# Patient Record
Sex: MALE | Race: WHITE | Employment: FULL TIME | URBAN - METROPOLITAN AREA
[De-identification: names, ages, dates, MRNs, and addresses within clinical notes are randomized per-mention and may not be internally consistent; named-entity substitution may affect disease eponyms.]

---

## 2017-09-20 RX ORDER — ATORVASTATIN CALCIUM 20 MG/1
TABLET, FILM COATED ORAL
Qty: 30 TABLET | Refills: 0 | Status: SHIPPED | OUTPATIENT
Start: 2017-09-20 | End: 2017-12-19 | Stop reason: SDUPTHER

## 2017-12-13 ENCOUNTER — TELEPHONE (OUTPATIENT)
Dept: CARDIOLOGY CLINIC | Age: 57
End: 2017-12-13

## 2017-12-13 NOTE — TELEPHONE ENCOUNTER
Attempted to call pt, number is no longer good. Called [facility] who is requesting atorvastatin to put a note in their chart for pt to contact us for appt .

## 2017-12-19 NOTE — TELEPHONE ENCOUNTER
Last ov-9/8/16 with RKG  Next ov- not scheduled  Last labs-3/30/14 TSH, Lipid, Cmp  Last EKG-6/10/13    We have been trying to contact the Mr Tatiana Nur to schedule. Phone number not working. Pharmacy contacted on 12/13/17 to have Pt contact us for appt.

## 2017-12-20 RX ORDER — ATORVASTATIN CALCIUM 20 MG/1
TABLET, FILM COATED ORAL
Qty: 30 TABLET | Refills: 0 | Status: SHIPPED | OUTPATIENT
Start: 2017-12-20 | End: 2018-02-07 | Stop reason: SDUPTHER

## 2018-01-04 ENCOUNTER — TELEPHONE (OUTPATIENT)
Dept: CARDIOLOGY CLINIC | Age: 58
End: 2018-01-04

## 2018-02-16 ENCOUNTER — TELEPHONE (OUTPATIENT)
Dept: CARDIOLOGY CLINIC | Age: 58
End: 2018-02-16

## 2018-02-26 ENCOUNTER — OFFICE VISIT (OUTPATIENT)
Dept: CARDIOLOGY CLINIC | Age: 58
End: 2018-02-26

## 2018-02-26 VITALS
SYSTOLIC BLOOD PRESSURE: 128 MMHG | HEART RATE: 70 BPM | DIASTOLIC BLOOD PRESSURE: 74 MMHG | WEIGHT: 178 LBS | HEIGHT: 74 IN | OXYGEN SATURATION: 96 % | BODY MASS INDEX: 22.84 KG/M2

## 2018-02-26 DIAGNOSIS — I25.10 CORONARY ARTERY DISEASE INVOLVING NATIVE CORONARY ARTERY OF NATIVE HEART WITHOUT ANGINA PECTORIS: Primary | ICD-10-CM

## 2018-02-26 DIAGNOSIS — E78.2 MIXED HYPERLIPIDEMIA: ICD-10-CM

## 2018-02-26 PROCEDURE — 99214 OFFICE O/P EST MOD 30 MIN: CPT | Performed by: INTERNAL MEDICINE

## 2018-02-26 RX ORDER — ATORVASTATIN CALCIUM 20 MG/1
20 TABLET, FILM COATED ORAL DAILY
Qty: 90 TABLET | Refills: 3 | Status: SHIPPED | OUTPATIENT
Start: 2018-02-26 | End: 2019-02-27 | Stop reason: SDUPTHER

## 2018-02-26 NOTE — PROGRESS NOTES
[address] Office Note  2/26/2018     Subjective:  Mr. Brittany Browne is here today for follow up of angioplasty for CAD and MI on 06/13/15. Today he reports he feels well over all. He continues to smoke but is ready to quit. He remains active. He denies chest pain, palpitations, syncope, dizziness or shortness of breath. HPI: Patient  underwent cardiac cath on 06/13/15 for an anterior MI. STACEY was placed in the OM1. He continues to smoke 1 ppd. He has not followed up in office for some time. Review of Systems:  12 point ROS negative in all areas as listed below except as in [place]  Constitutional, EENT, Cardiovascular, pulmonary, GI, , Musculoskeletal, skin, neurological, hematological, endocrine, Psychiatric  Reviewed past medical history, social, and family history. Smoking 1PPD but trying to quit  Mother: Colon cancer, no heart disease  Father:  No heart disease   Past Medical History:   Diagnosis Date    CAD (coronary artery disease)      Past Surgical History:   Procedure Laterality Date    CORONARY ANGIOPLASTY WITH STENT PLACEMENT  06/2013       Objective:   /74   Pulse 70   Ht 6' 2\" (1.88 m)   Wt 178 lb (80.7 kg)   SpO2 96%   BMI 22.85 kg/m²     Wt Readings from Last 3 Encounters:   02/26/18 178 lb (80.7 kg)   09/08/16 174 lb (78.9 kg)   09/11/15 176 lb (79.8 kg)       Physical Exam:  General: No Respiratory distress, appears well developed and well nourished. Eyes:  Sclera nonicteric  Nose/Sinuses:  negative findings: nose shows no deformity, asymmetry, or inflammation, nasal mucosa normal, septum midline with no perforation or bleeding  Back:  no pain to palpation  Joint:  no active joint inflammation  Musculoskeletal:  negative  Skin:  Warm and dry  Neck:  Negative for JVD and Carotid Bruits or subclavian bruits. Chest:  Diminished breath sounds to auscultation bilaterally, respiration easy  Cardiovascular:  RRR, S1S2 normal, no murmur, no rub or thrill.   Abdomen:  Soft

## 2018-02-26 NOTE — COMMUNICATION BODY
normal liver and spleen, no abnormal pulsations. Extremities:   No edema, clubbing, cyanosis,  Pulses: Femoral and pedal pulses are normal.  Neuro: intact    Medications:   Outpatient Encounter Prescriptions as of 2/26/2018   Medication Sig Dispense Refill    metoprolol tartrate (LOPRESSOR) 25 MG tablet TAKE 1/2 TABLET BY MOUTH TWO TIMES A DAY **GENERIC FOR LOPRESSOR** 30 tablet 2    aspirin 81 MG tablet Take 1 tablet by mouth daily. 30 tablet 11    atorvastatin (LIPITOR) 20 MG tablet TAKE 1 TABLET BY MOUTH NIGHTLY 30 tablet 0     No facility-administered encounter medications on file as of 2/26/2018. Lab Data:  LIPID: 6/11/13  Cholesterol, Total 152  Triglycerides 100  HDL 34 (L)  LDL Calculated 97      Lab Results   Component Value Date    TRIG 101 03/30/2014    TRIG 100 06/11/2013     Lab Results   Component Value Date    HDL 38 (L) 03/30/2014    HDL 34 (L) 06/11/2013     Lab Results   Component Value Date    LDLCALC 67 03/30/2014    LDLCALC 97 06/11/2013     Lab Results   Component Value Date    LABVLDL 20 03/30/2014    LABVLDL 20 06/11/2013     IMAGING:   Echo: 4/1/14  Summary  Normal left ventricle size and wall thickness. The left ventricular systolic  function is mildly reduced with an ejection fraction of 50%. There is  hypokinesis of the inferior lateral wall. Mild mitral regurgitation. Echo: 06/12/2013  Normal left ventricle size, wall thickness and systolic function with an  estimated ejection fraction of 55%. No regional wall motion abnormalities  are seen. Mitral valve is structurally normal.  Mild mitral regurgitation is present. Cardiac Cath: 06/13/2013  LM-normal   LAD-normal   CX-20% mid   OM1-100% to 0% with thrombectomy, PTCA, and Resolute STACEY 2.25 x 26   RCA- mild luminal irregularities   LVEF- 45% with lateral hypokinesis    Assessment:  Ayan Silva was seen today for 1 year follow up, coronary artery disease and hyperlipidemia.     Diagnoses and associated orders for this

## 2019-02-27 RX ORDER — ATORVASTATIN CALCIUM 20 MG/1
TABLET, FILM COATED ORAL
Qty: 90 TABLET | Refills: 0 | Status: SHIPPED | OUTPATIENT
Start: 2019-02-27 | End: 2019-06-10 | Stop reason: SDUPTHER

## 2019-06-10 RX ORDER — ATORVASTATIN CALCIUM 20 MG/1
TABLET, FILM COATED ORAL
Qty: 90 TABLET | Refills: 5 | Status: SHIPPED | OUTPATIENT
Start: 2019-06-10 | End: 2020-07-13

## 2019-07-08 ENCOUNTER — OFFICE VISIT (OUTPATIENT)
Dept: CARDIOLOGY CLINIC | Age: 59
End: 2019-07-08
Payer: COMMERCIAL

## 2019-07-08 VITALS
HEIGHT: 74 IN | WEIGHT: 179 LBS | OXYGEN SATURATION: 98 % | HEART RATE: 64 BPM | BODY MASS INDEX: 22.97 KG/M2 | DIASTOLIC BLOOD PRESSURE: 64 MMHG | SYSTOLIC BLOOD PRESSURE: 110 MMHG

## 2019-07-08 DIAGNOSIS — Z72.0 TOBACCO ABUSE: ICD-10-CM

## 2019-07-08 DIAGNOSIS — I25.10 CORONARY ARTERY DISEASE INVOLVING NATIVE CORONARY ARTERY OF NATIVE HEART WITHOUT ANGINA PECTORIS: Primary | ICD-10-CM

## 2019-07-08 DIAGNOSIS — E78.2 MIXED HYPERLIPIDEMIA: ICD-10-CM

## 2019-07-08 PROCEDURE — 99214 OFFICE O/P EST MOD 30 MIN: CPT | Performed by: INTERNAL MEDICINE

## 2019-07-08 NOTE — PATIENT INSTRUCTIONS
artery disease involving native coronary artery of native heart without angina pectoris    Hyperlipemia  No recent lipids   Needs to be rechecked     Smoker   Smoking cessation discussed Help and encouragement provided. Plan:  1. Importance of smoking cessation discussed. Resources available: 8-951-gwubtcz  2. Continue all current medications he is on asa statin and beta blockers  3. Healthy lifestyle education reviewed including nutrition, exercise and activity  4. Will check fasting lipids and CMP   5. Follow up in 1 year        QUALITY MEASURES  1. Tobacco Cessation Counseling: YES   2. Retake of BP if >140/90:   NA  3. Documentation to PCP/referring for new patient:  Sent to PCP at close of office visit  4. CAD patient on anti-platelet: ASA  5. CAD patient on STATIN therapy:  Atorvastatin   6. Patient with CHF and aFib on anticoagulation:  NA     I, Dr. Lyric Lazaro, personally performed the services described in this documentation, as scribed by the above signed scribe in my presence. It is both accurate and complete to my knowledge. I agree with the details independently gathered by the clinical support staff, while the remaining scribed note accurately describes my personal service to the patient.       [address] [place], MD 7/8/2019 3:44 PM

## 2019-07-08 NOTE — PROGRESS NOTES
liver and spleen, no abnormal pulsations. Extremities:   No edema, clubbing, cyanosis,  Pulses: pedal pulses are normal.  Neuro: intact    Medications:   Outpatient Encounter Medications as of 7/8/2019   Medication Sig Dispense Refill    metoprolol tartrate (LOPRESSOR) 25 MG tablet TAKE ONE-HALF TABLET BY MOUTH TWO TIMES DAILY. GENERIC FOR LOPRESSOR 90 tablet 5    atorvastatin (LIPITOR) 20 MG tablet TAKE 1 TABLET BY MOUTH ONE TIME A DAY . 90 tablet 5    aspirin 81 MG tablet Take 1 tablet by mouth daily. 30 tablet 11     No facility-administered encounter medications on file as of 7/8/2019. Lab Data:  LIPID: 6/11/13  Cholesterol, Total 152  Triglycerides 100  HDL 34 (L)  LDL Calculated 97      Lab Results   Component Value Date    TRIG 101 03/30/2014    TRIG 100 06/11/2013     Lab Results   Component Value Date    HDL 38 (L) 03/30/2014    HDL 34 (L) 06/11/2013     Lab Results   Component Value Date    LDLCALC 67 03/30/2014    LDLCALC 97 06/11/2013     Lab Results   Component Value Date    LABVLDL 20 03/30/2014    LABVLDL 20 06/11/2013     IMAGING:   Echo: 4/1/14  Summary  Normal left ventricle size and wall thickness. The left ventricular systolic  function is mildly reduced with an ejection fraction of 50%. There is  hypokinesis of the inferior lateral wall. Mild mitral regurgitation. Echo: 06/12/2013  Normal left ventricle size, wall thickness and systolic function with an  estimated ejection fraction of 55%. No regional wall motion abnormalities  are seen. Mitral valve is structurally normal.  Mild mitral regurgitation is present. Cardiac Cath: 06/13/2013  LM-normal   LAD-normal   CX-20% mid   OM1-100% to 0% with thrombectomy, PTCA, and Resolute STACEY 2.25 x 26   RCA- mild luminal irregularities   LVEF- 45% with lateral hypokinesis    Assessment:  Roderick Tolbert was seen today for 1 year follow up, coronary artery disease and hyperlipidemia.     Diagnoses and associated orders for this visit:    Coronary artery disease involving native coronary artery of native heart without angina pectoris    Hyperlipemia  No recent lipids   Needs to be rechecked     Smoker   Smoking cessation discussed Help and encouragement provided. Plan:  1. Importance of smoking cessation discussed. Resources available: 3-508-ykjovys  2. Continue all current medications he is on asa statin and beta blockers  3. Healthy lifestyle education reviewed including nutrition, exercise and activity  4. Will check fasting lipids and CMP   5. Follow up in 1 year        QUALITY MEASURES  1. Tobacco Cessation Counseling: YES   2. Retake of BP if >140/90:   NA  3. Documentation to PCP/referring for new patient:  Sent to PCP at close of office visit  4. CAD patient on anti-platelet: ASA  5. CAD patient on STATIN therapy:  Atorvastatin   6. Patient with CHF and aFib on anticoagulation:  NA     I, Dr. Scott Villasenor, personally performed the services described in this documentation, as scribed by the above signed scribe in my presence. It is both accurate and complete to my knowledge. I agree with the details independently gathered by the clinical support staff, while the remaining scribed note accurately describes my personal service to the patient.       [address] [place], MD 7/8/2019 3:44 PM

## 2020-07-13 RX ORDER — ATORVASTATIN CALCIUM 20 MG/1
TABLET, FILM COATED ORAL
Qty: 90 TABLET | Refills: 0 | Status: SHIPPED | OUTPATIENT
Start: 2020-07-13 | End: 2020-08-25 | Stop reason: SDUPTHER

## 2020-08-20 NOTE — PROGRESS NOTES
[facility] Office Note  8/25/2020     Subjective:  Mr. Hussein Bhatti is here today for cardiology  follow up of  CAD, HLD     [place]:   Today he reports feeling well no new complaints. Denies chest pain, shortness of breath, edema, dizziness, palpitations and syncope. He continues to smoke daily. 1 ppd no desire to quit    PMH:  Patient  underwent cardiac cath on 06/13/15 for an anterior MI. STACEY was placed in the OM1. He continues to smoke 1 ppd. He has not followed up in office for some time. Review of Systems:  12 point ROS negative in all areas as listed below except as in [place]  Constitutional, EENT, Cardiovascular, pulmonary, GI, , Musculoskeletal, skin, neurological, hematological, endocrine, Psychiatric  Reviewed past medical history, social, and family history. Smoking 1PPD but trying to quit  Mother: Colon cancer, no heart disease  Father:  No heart disease   Past Medical History:   Diagnosis Date    CAD (coronary artery disease)      Past Surgical History:   Procedure Laterality Date    CORONARY ANGIOPLASTY WITH STENT PLACEMENT  06/2013       Objective:   /60   Pulse 67   Temp 96.8 °F (36 °C)   Ht 6' 2\" (1.88 m)   Wt 183 lb (83 kg)   SpO2 98%   BMI 23.50 kg/m²     Wt Readings from Last 3 Encounters:   08/25/20 183 lb (83 kg)   07/08/19 179 lb (81.2 kg)   02/26/18 178 lb (80.7 kg)       Physical Exam:  General: No Respiratory distress, appears well developed and well nourished. Eyes:  Sclera nonicteric  Nose/Sinuses:  negative findings: nose shows no deformity, asymmetry, or inflammation, nasal mucosa normal, septum midline with no perforation or bleeding  Back:  no pain to palpation  Joint:  no active joint inflammation  Musculoskeletal:  negative  Skin:  Warm and dry  Neck:  Negative for JVD and Carotid Bruits or subclavian bruits. Chest:  Clear to auscultation bilaterally, respiration easy  Cardiovascular:  RRR, S1S2 normal, no murmur, no rub or thrill.   Abdomen: thrombectomy, PTCA, and Resolute STACEY 2.25 x 26   RCA- mild luminal irregularities   LVEF- 45% with lateral hypokinesis    Assessment:  Encounter Diagnoses   Name Primary?  Coronary artery disease involving native coronary artery of native heart without angina pectoris Yes    Mixed hyperlipidemia        Ignacia Mcadams was seen today for 1 year follow up, coronary artery disease and hyperlipidemia. Diagnoses and associated orders for this visit:    Coronary artery disease involving native coronary artery of native heart without angina pectoris    Hyperlipemia  No recent lipids   Needs to be rechecked     Smoker   Smoking cessation discussed Help and encouragement provided. Plan:  1. Importance of smoking cessation discussed. Resources available: 1-800-quit-now  2. Continue all current medications he is on asa statin and beta blockers  3. Healthy lifestyle education reviewed including nutrition, exercise and activity  4. Will check fasting lipids cbc, and CMP   5. Follow up in 1 year        QUALITY MEASURES  1. Tobacco Cessation Counseling: YES   2. Retake of BP if >140/90:   NA  3. Documentation to PCP/referring for new patient:  Sent to PCP at close of office visit  4. CAD patient on anti-platelet: ASA  5. CAD patient on STATIN therapy:  Atorvastatin   6. Patient with CHF and aFib on anticoagulation:  NA     This note was scribed in the presence of  Shannan Barbosa MD by Anila Borrego RN  I, Dr. Shannan Barbosa, personally performed the services described in this documentation, as scribed by the above signed scribe in my presence. It is both accurate and complete to my knowledge. I agree with the details independently gathered by the clinical support staff, while the remaining scribed note accurately describes my personal service to the patient.         Levon Ortiz MD 8/25/2020 3:37 PM

## 2020-08-25 ENCOUNTER — OFFICE VISIT (OUTPATIENT)
Dept: CARDIOLOGY CLINIC | Age: 60
End: 2020-08-25
Payer: COMMERCIAL

## 2020-08-25 VITALS
BODY MASS INDEX: 23.49 KG/M2 | SYSTOLIC BLOOD PRESSURE: 100 MMHG | HEIGHT: 74 IN | TEMPERATURE: 96.8 F | OXYGEN SATURATION: 98 % | HEART RATE: 67 BPM | WEIGHT: 183 LBS | DIASTOLIC BLOOD PRESSURE: 60 MMHG

## 2020-08-25 PROCEDURE — 99214 OFFICE O/P EST MOD 30 MIN: CPT | Performed by: INTERNAL MEDICINE

## 2020-08-25 RX ORDER — ATORVASTATIN CALCIUM 20 MG/1
20 TABLET, FILM COATED ORAL DAILY
Qty: 90 TABLET | Refills: 3 | Status: SHIPPED | OUTPATIENT
Start: 2020-08-25 | End: 2021-09-10

## 2020-08-25 NOTE — PATIENT INSTRUCTIONS
Plan:  1. Importance of smoking cessation discussed. Resources available: 1-800-quit-now  2. Continue all current medications he is on asa statin and beta blockers  3. Healthy lifestyle education reviewed including nutrition, exercise and activity  4. Will check fasting lipids cbc, and CMP   5.  Follow up in 1 year

## 2021-09-10 RX ORDER — ATORVASTATIN CALCIUM 20 MG/1
20 TABLET, FILM COATED ORAL DAILY
Qty: 30 TABLET | Refills: 0 | Status: SHIPPED | OUTPATIENT
Start: 2021-09-10 | End: 2021-10-19

## 2021-09-10 NOTE — TELEPHONE ENCOUNTER
LV 8/25/2020 Dr. Elida Thakur ([facility])  NEEDS OV-this message was placed on refills. Gave only 30 day supply.   Attempted to call pt-phone busy

## 2021-10-18 ENCOUNTER — TELEPHONE (OUTPATIENT)
Dept: CARDIOLOGY CLINIC | Age: 61
End: 2021-10-18

## 2021-10-18 NOTE — TELEPHONE ENCOUNTER
Pt needs refills on the following metoprolol tartrate (LOPRESSOR) 25 MG tablet and atorvastatin (LIPITOR) 20 MG tablet. Pt needs 1 yr appt ,but no openings at [place] or [place](pt prefers due to working downtown [email or web address]) needs the latest available.     [address], [address] [facility]

## 2021-10-19 RX ORDER — ATORVASTATIN CALCIUM 20 MG/1
TABLET, FILM COATED ORAL
Qty: 30 TABLET | Refills: 0 | Status: SHIPPED | OUTPATIENT
Start: 2021-10-19 | End: 2021-10-25 | Stop reason: SDUPTHER

## 2021-10-19 NOTE — TELEPHONE ENCOUNTER
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

## 2021-10-21 NOTE — TELEPHONE ENCOUNTER
8/25/2020 RK, meds pended.        Per Jagjit Bolanos pt can be scheduled at [facility] 11/3 at 12:45 pm.

## 2021-10-25 RX ORDER — ATORVASTATIN CALCIUM 20 MG/1
20 TABLET, FILM COATED ORAL DAILY
Qty: 30 TABLET | Refills: 5 | Status: SHIPPED | OUTPATIENT
Start: 2021-10-25 | End: 2022-01-25 | Stop reason: SDUPTHER

## 2021-10-25 RX ORDER — ATORVASTATIN CALCIUM 20 MG/1
TABLET, FILM COATED ORAL
Qty: 90 TABLET | Refills: 0 | OUTPATIENT
Start: 2021-10-25

## 2022-01-25 ENCOUNTER — OFFICE VISIT (OUTPATIENT)
Dept: CARDIOLOGY CLINIC | Age: 62
End: 2022-01-25
Payer: COMMERCIAL

## 2022-01-25 VITALS
HEART RATE: 61 BPM | SYSTOLIC BLOOD PRESSURE: 118 MMHG | BODY MASS INDEX: 24.13 KG/M2 | WEIGHT: 188 LBS | OXYGEN SATURATION: 98 % | HEIGHT: 74 IN | DIASTOLIC BLOOD PRESSURE: 76 MMHG

## 2022-01-25 DIAGNOSIS — I25.10 CORONARY ARTERY DISEASE INVOLVING NATIVE CORONARY ARTERY OF NATIVE HEART WITHOUT ANGINA PECTORIS: Primary | ICD-10-CM

## 2022-01-25 DIAGNOSIS — E78.2 MIXED HYPERLIPIDEMIA: ICD-10-CM

## 2022-01-25 PROCEDURE — 99214 OFFICE O/P EST MOD 30 MIN: CPT | Performed by: INTERNAL MEDICINE

## 2022-01-25 RX ORDER — ATORVASTATIN CALCIUM 20 MG/1
20 TABLET, FILM COATED ORAL DAILY
Qty: 90 TABLET | Refills: 3 | Status: SHIPPED | OUTPATIENT
Start: 2022-01-25

## 2022-01-25 NOTE — PATIENT INSTRUCTIONS
Plan:  1. Lab work from PCP once completed from visit today. 2.  Refills warranted for metoprolol tartrate and atorvastatin  3. Follow up with me in 1 year.

## 2022-01-25 NOTE — PROGRESS NOTES
[facility] Office Note  1/25/2022     Subjective:  Mr. Dustin Velarde is here today for 1 year cardiology  follow up of  CAD, HLD     [place]:   8/25/20 he reported feeling well no new complaints. Denied chest pain, shortness of breath, edema, dizziness, palpitations and syncope. He continues to smoke daily. 1 ppd no desire to quit     Today he reports that he has been feeling okay since last being seen. Compliant with taking all medications and tolerates them well. Has an appointment today with his PCP and will get lab work completed through that office. Denies chest pain, sob, dizziness, syncope and edema. PMH:  Patient  underwent cardiac cath on 06/13/15 for an anterior MI. STACEY was placed in the OM1. He continues to smoke 1 ppd. He has not followed up in office for some time. Review of Systems:  12 point ROS negative in all areas as listed below except as in [place]  Constitutional, EENT, Cardiovascular, pulmonary, GI, , Musculoskeletal, skin, neurological, hematological, endocrine, Psychiatric  Reviewed past medical history, social, and family history. Smoking 1PPD but trying to quit  Mother: Colon cancer, no heart disease  Father:  No heart disease   Past Medical History:   Diagnosis Date    CAD (coronary artery disease)      Past Surgical History:   Procedure Laterality Date    CORONARY ANGIOPLASTY WITH STENT PLACEMENT  06/2013       Objective:   /76   Pulse 61   Ht 6' 2\" (1.88 m)   Wt 188 lb (85.3 kg)   SpO2 98%   BMI 24.14 kg/m²     Wt Readings from Last 3 Encounters:   01/25/22 188 lb (85.3 kg)   08/25/20 183 lb (83 kg)   07/08/19 179 lb (81.2 kg)       Physical Exam:  General: No Respiratory distress, appears well developed and well nourished.    Eyes:  Sclera nonicteric  Nose/Sinuses:  negative findings: nose shows no deformity, asymmetry, or inflammation, nasal mucosa normal, septum midline with no perforation or bleeding  Back:  no pain to palpation  Joint:  no active joint inflammation  Musculoskeletal:  negative  Skin:  Warm and dry  Neck:  Negative for JVD and Carotid Bruits or subclavian bruits. Chest:  Clear to auscultation bilaterally, respiration easy  Cardiovascular:  RRR, S1S2 normal, no murmur, no rub or thrill. Abdomen:  Soft normal liver and spleen, no abnormal pulsations. Extremities:   No edema, clubbing, cyanosis,  Pulses: pedal pulses are normal.  Neuro: intact    Medications:   Outpatient Encounter Medications as of 1/25/2022   Medication Sig Dispense Refill    metoprolol tartrate (LOPRESSOR) 25 MG tablet Take 0.5 tablets by mouth 2 times daily 30 tablet 5    atorvastatin (LIPITOR) 20 MG tablet Take 1 tablet by mouth daily 30 tablet 5    aspirin 81 MG tablet Take 1 tablet by mouth daily. 30 tablet 11     No facility-administered encounter medications on file as of 1/25/2022. Lab Data:  LIPID: 6/11/13  Cholesterol, Total 152  Triglycerides 100  HDL 34 (L)  LDL Calculated 97      Lab Results   Component Value Date    TRIG 101 03/30/2014    TRIG 100 06/11/2013     Lab Results   Component Value Date    HDL 38 (L) 03/30/2014    HDL 34 (L) 06/11/2013     Lab Results   Component Value Date    LDLCALC 67 03/30/2014    LDLCALC 97 06/11/2013     Lab Results   Component Value Date    LABVLDL 20 03/30/2014    LABVLDL 20 06/11/2013     IMAGING:   Echo: 4/1/14  Summary  Normal left ventricle size and wall thickness. The left ventricular systolic  function is mildly reduced with an ejection fraction of 50%. There is  hypokinesis of the inferior lateral wall. Mild mitral regurgitation. Echo: 06/12/2013  Normal left ventricle size, wall thickness and systolic function with an  estimated ejection fraction of 55%. No regional wall motion abnormalities  are seen. Mitral valve is structurally normal.  Mild mitral regurgitation is present.     Cardiac Cath: 06/13/2013  LM-normal   LAD-normal   CX-20% mid   OM1-100% to 0% with thrombectomy, PTCA, and Resolute STACEY 2.25 x 26   RCA- mild luminal irregularities   LVEF- 45% with lateral hypokinesis    Assessment:  Encounter Diagnoses   Name Primary?  Coronary artery disease involving native heart without angina pectoris, unspecified vessel or lesion type Yes    Mixed hyperlipidemia        Sidra Peabody was seen today for 1 year follow up, coronary artery disease and hyperlipidemia. Diagnoses and associated orders for this visit:    Coronary artery disease involving native coronary artery of native heart without angina pectoris    Hyperlipemia  No recent lipids   Needs to be rechecked he is getting it done today. Smoker   Smoking cessation discussed Help and encouragement provided. Plan:  1. Lab work from PCP once completed from visit today. 2.  Refills warranted for metoprolol tartrate and atorvastatin  3. Follow up with me in 1 year. QUALITY MEASURES  1. Tobacco Cessation Counseling: YES   2. Retake of BP if >140/90:   NA  3. Documentation to PCP/referring for new patient:  Sent to PCP at close of office visit  4. CAD patient on anti-platelet: ASA  5. CAD patient on STATIN therapy:  Atorvastatin   6. Patient with CHF and aFib on anticoagulation:  NA     Scribe's attestation: This note was scribed in the presence of Dr. Kaylynn Cortez MD   by Hamilton Savage LPN        I, Dr. Kaylynn Cortez, personally performed the services described in this documentation, as scribed by the above signed scribe in my presence. It is both accurate and complete to my knowledge. I agree with the details independently gathered by the clinical support staff, while the remaining scribed note accurately describes my personal service to the patient.

## 2023-02-04 DIAGNOSIS — I25.10 CORONARY ARTERY DISEASE INVOLVING NATIVE CORONARY ARTERY OF NATIVE HEART WITHOUT ANGINA PECTORIS: ICD-10-CM

## 2023-02-04 DIAGNOSIS — E78.2 MIXED HYPERLIPIDEMIA: ICD-10-CM

## 2023-02-06 RX ORDER — ATORVASTATIN CALCIUM 20 MG/1
TABLET, FILM COATED ORAL
Qty: 90 TABLET | Refills: 0 | Status: SHIPPED | OUTPATIENT
Start: 2023-02-06

## 2023-03-23 ENCOUNTER — OFFICE VISIT (OUTPATIENT)
Dept: CARDIOLOGY CLINIC | Age: 63
End: 2023-03-23
Payer: COMMERCIAL

## 2023-03-23 VITALS
WEIGHT: 187.5 LBS | OXYGEN SATURATION: 98 % | HEIGHT: 74 IN | HEART RATE: 70 BPM | SYSTOLIC BLOOD PRESSURE: 110 MMHG | BODY MASS INDEX: 24.06 KG/M2 | DIASTOLIC BLOOD PRESSURE: 60 MMHG

## 2023-03-23 DIAGNOSIS — E78.2 MIXED HYPERLIPIDEMIA: Primary | ICD-10-CM

## 2023-03-23 DIAGNOSIS — I25.10 CORONARY ARTERY DISEASE INVOLVING NATIVE CORONARY ARTERY OF NATIVE HEART WITHOUT ANGINA PECTORIS: ICD-10-CM

## 2023-03-23 PROCEDURE — 99214 OFFICE O/P EST MOD 30 MIN: CPT | Performed by: INTERNAL MEDICINE

## 2023-03-23 PROCEDURE — 93000 ELECTROCARDIOGRAM COMPLETE: CPT | Performed by: INTERNAL MEDICINE

## 2023-03-23 RX ORDER — ATORVASTATIN CALCIUM 20 MG/1
20 TABLET, FILM COATED ORAL DAILY
Qty: 90 TABLET | Refills: 3 | Status: SHIPPED | OUTPATIENT
Start: 2023-03-23

## 2023-03-23 NOTE — PATIENT INSTRUCTIONS
Plan:  EKG today unchanged  Lab work reviewed - Labs are due March 2023. TSH, CBC. CMP and fasting lipids. Call us when you have has your lab work done at [facility]. Current medications reviewed. No changes at this time. Refills given as warranted.    Follow up with me in 1 year

## 2023-03-23 NOTE — PROGRESS NOTES
4/1/14  Summary  Normal left ventricle size and wall thickness. The left ventricular systolic  function is mildly reduced with an ejection fraction of 50%. There is  hypokinesis of the inferior lateral wall. Mild mitral regurgitation. Echo: 06/12/2013  Normal left ventricle size, wall thickness and systolic function with an  estimated ejection fraction of 55%. No regional wall motion abnormalities  are seen. Mitral valve is structurally normal.  Mild mitral regurgitation is present. Cardiac Cath: 06/13/2013  LM-normal   LAD-normal   CX-20% mid   OM1-100% to 0% with thrombectomy, PTCA, and Resolute STACEY 2.25 x 26   RCA- mild luminal irregularities   LVEF- 45% with lateral hypokinesis    Assessment:  Encounter Diagnoses   Name Primary? Mixed hyperlipidemia Yes    Coronary artery disease involving native coronary artery of native heart without angina pectoris          Manuela Qureshi was seen today for 1 year follow up, coronary artery disease and hyperlipidemia. Diagnoses and associated orders for this visit:    Coronary artery disease involving native coronary artery of native heart without angina pectoris  Continue statin beta blockers and asa  Hyperlipemia  LDL at goal Jan 2022    Needs to be rechecked reordered them today. Continue atorvastatin   Smoker   Smoking cessation discussed Help and encouragement provided. Plan:  EKG today unchanged  Lab work reviewed - Labs are due March 2023. TSH, CBC. CMP and fasting lipids. Call us when you have has your lab work done at [facility]. Current medications reviewed. No changes at this time. Refills given as warranted. Follow up with me in 1 year       This note has been scribed in the presence of Dr Jonathon Bui MD, by Rocael Zavala RN.     I, Dr. Jonathon Bui, personally performed the services described in this documentation, as scribed by the above signed scribe in my presence. It is both accurate and complete to my knowledge.  I agree with the details

## 2023-03-23 NOTE — LETTER
March 24, 2023      Elva Vega MD  [address]      Patient: Tyrell Grimes   MR Number: [id number]   YOB: 1960   Date of Visit: 3/23/2023       Dear Elva Vega:    Thank you for referring Gagandeep Wade to me for evaluation/treatment. Below are the relevant portions of my assessment and plan of care. If you have questions, please do not hesitate to call me. I look forward to following Chriss Cunningham along with you.     Sincerely,        Dillon Arenas MD

## 2024-04-27 DIAGNOSIS — E78.2 MIXED HYPERLIPIDEMIA: ICD-10-CM

## 2024-04-27 DIAGNOSIS — I25.10 CORONARY ARTERY DISEASE INVOLVING NATIVE CORONARY ARTERY OF NATIVE HEART WITHOUT ANGINA PECTORIS: ICD-10-CM

## 2024-04-29 RX ORDER — ATORVASTATIN CALCIUM 20 MG/1
20 TABLET, FILM COATED ORAL DAILY
Qty: 90 TABLET | Refills: 0 | Status: SHIPPED | OUTPATIENT
Start: 2024-04-29

## 2024-04-29 NOTE — TELEPHONE ENCOUNTER
Last OV 3/23/23  No upcoming OV  General chemistry 1/25/22 (care everywhere)  LIPID 1/25/22 (Care Everywhere)      Plan:  EKG today unchanged  Lab work reviewed - Labs are due March 2023. TSH, CBC. CMP and fasting lipids. Call us when you have has your lab work done at [facility].   Current medications reviewed.  No changes at this time. Refills given as warranted.   Follow up with me in 1 year       Attempted to call pt, no answer. LMOVM for pt to return call back to office.

## 2024-06-17 NOTE — PROGRESS NOTES
[facility]   Office Note  6/19/2024     Subjective:  Mr. Da Silva is here today for 1 year cardiology follow up of CAD and HLD     [place]:   Today, he reports that he feels well overall. He smokes 1 pack of cigarettes daily. He has attempted cessation previously with chantix. Patient denies current edema, chest pain, shortness of breath, palpitations, dizziness or syncope. Patient is taking all cardiac medications as prescribed and tolerates them well.     PMH:  Hilario Da Silva is a 63 y.o. male with a PMH of CAD and HLD.   Patient underwent cardiac cath on 06/13/15 for an anterior MI. STACEY was placed in the OM1. He continues to smoke 1 ppd.   He has not followed up in office for some time.   At OV 8/25/20 he reported he continues to smoke daily. 1 ppd no desire to quit.  At OV 1/25/22 he reported that he has been feeling okay since last being seen.  He stated he had an appointment today with his PCP and will get lab work completed through that office.    Review of Systems:  12 point ROS negative in all areas as listed below except as in [place]  Constitutional, EENT, Cardiovascular, pulmonary, GI, , Musculoskeletal, skin, neurological, hematological, endocrine, Psychiatric  Reviewed past medical history, social, and family history.   Smoking 1PPD but trying to quit  Mother: Colon cancer, no heart disease  Father:  No heart disease   Past Medical History:   Diagnosis Date    CAD (coronary artery disease)      Past Surgical History:   Procedure Laterality Date    CORONARY ANGIOPLASTY WITH STENT PLACEMENT  06/2013     Objective:   /72   Pulse 64   Ht 1.88 m (6' 2\")   Wt 85.3 kg (188 lb)   SpO2 97%   BMI 24.14 kg/m²     Wt Readings from Last 3 Encounters:   06/19/24 85.3 kg (188 lb)   03/23/23 85 kg (187 lb 8 oz)   01/25/22 85.3 kg (188 lb)     Physical Exam:  General: No Respiratory distress, appears well developed and well nourished.   Eyes:  Sclera nonicteric  Nose/Sinuses:  negative findings: nose

## 2024-06-19 ENCOUNTER — OFFICE VISIT (OUTPATIENT)
Dept: CARDIOLOGY CLINIC | Age: 64
End: 2024-06-19
Payer: COMMERCIAL

## 2024-06-19 VITALS
SYSTOLIC BLOOD PRESSURE: 124 MMHG | WEIGHT: 188 LBS | OXYGEN SATURATION: 97 % | BODY MASS INDEX: 24.13 KG/M2 | DIASTOLIC BLOOD PRESSURE: 72 MMHG | HEIGHT: 74 IN | HEART RATE: 64 BPM

## 2024-06-19 DIAGNOSIS — Z95.5 STATUS POST CORONARY ARTERY STENT PLACEMENT: ICD-10-CM

## 2024-06-19 DIAGNOSIS — I25.10 CORONARY ARTERY DISEASE INVOLVING NATIVE CORONARY ARTERY OF NATIVE HEART WITHOUT ANGINA PECTORIS: Primary | ICD-10-CM

## 2024-06-19 DIAGNOSIS — E78.2 MIXED HYPERLIPIDEMIA: ICD-10-CM

## 2024-06-19 DIAGNOSIS — F17.200 SMOKER: ICD-10-CM

## 2024-06-19 PROCEDURE — 99214 OFFICE O/P EST MOD 30 MIN: CPT | Performed by: INTERNAL MEDICINE

## 2024-06-19 RX ORDER — ASPIRIN 81 MG/1
81 TABLET ORAL DAILY
Qty: 90 TABLET | Refills: 3 | Status: SHIPPED | OUTPATIENT
Start: 2024-06-19

## 2024-06-19 RX ORDER — ATORVASTATIN CALCIUM 20 MG/1
20 TABLET, FILM COATED ORAL DAILY
Qty: 90 TABLET | Refills: 3 | Status: SHIPPED | OUTPATIENT
Start: 2024-06-19

## 2024-06-19 NOTE — PATIENT INSTRUCTIONS
Plan:  Discussed smoking cessation. [id number] for free nicotine patches and resources.   Screening lung scan   Lab work reviewed - Labs are due now. TSH, CBC, CMP and fasting lipids.  Current medications reviewed.  No changes at this time. Refills given as warranted.   Follow up with me in 1 year       Your provider has ordered testing for further evaluation.  An order/prescription has been included in your paper work.   To schedule outpatient testing, contact Central Scheduling by calling [address] [place] [address] ([phone]).         Learning About Lung Cancer Screening  What is screening for lung cancer?     Lung cancer screening is a way to find some lung cancers early, before a person has any symptoms of the cancer.  Lung cancer screening may help those who have the highest risk for lung cancer--people age 50 and older who are or were heavy smokers. For most people, who aren't at increased risk, screening for lung cancer probably isn't helpful.  Screening won't prevent cancer. And it may not find all lung cancers. Lung cancer screening may lower the risk of dying from lung cancer in a small number of people.  How is it done?  Lung cancer screening is done with a low-dose CT (computed tomography) scan. A CT scan uses X-rays, or radiation, to make detailed pictures of your body. Experts recommend that screening be done in medical centers that focus on finding and treating lung cancer.  Who is screening recommended for?  Lung cancer screening is recommended for people age 50 and older who are or were heavy smokers. That means people with a smoking history of at least 20 pack years. A pack year is a way to measure how heavy a smoker you are or were.  To figure out your pack years, multiply how many packs a day on average (assuming 20 cigarettes per pack) you have smoked by how many years you have smoked. For example:  If you smoked 1 pack a day for 20 years, that's 1 times 20. So you have a smoking history of 20 pack

## 2025-07-23 DIAGNOSIS — I25.10 CORONARY ARTERY DISEASE INVOLVING NATIVE CORONARY ARTERY OF NATIVE HEART WITHOUT ANGINA PECTORIS: ICD-10-CM

## 2025-07-23 DIAGNOSIS — E78.2 MIXED HYPERLIPIDEMIA: ICD-10-CM

## 2025-07-23 NOTE — TELEPHONE ENCOUNTER
Attempted to call pt, no answer. LMOVM for pt to return call back. Pt needs appt with [facility].       Last Office Visit: 6/19/2024 Provider: [facility]  **Is provider OOT? No    Next Office Visit: Visit date not found Provider: [facility]  **If no OV, when does pt need to be seen? 1 year  **Has patient already had 30 day supply? No    Lab orders needed? Pt hasn't had labs completed for us since 2018.   Encounter provider correct? Yes If not, change provider  Script changes since last refill? no      LIPID 1/18/25  CMP 1/18/25   Care everywhere

## 2025-07-24 RX ORDER — ATORVASTATIN CALCIUM 20 MG/1
20 TABLET, FILM COATED ORAL DAILY
Qty: 90 TABLET | Refills: 0 | Status: SHIPPED | OUTPATIENT
Start: 2025-07-24

## 2025-07-24 RX ORDER — METOPROLOL TARTRATE 25 MG/1
TABLET, FILM COATED ORAL
Qty: 90 TABLET | Refills: 0 | Status: SHIPPED | OUTPATIENT
Start: 2025-07-24

## 2025-08-19 ENCOUNTER — OFFICE VISIT (OUTPATIENT)
Dept: CARDIOLOGY CLINIC | Age: 65
End: 2025-08-19
Payer: COMMERCIAL

## 2025-08-19 VITALS
HEIGHT: 74 IN | OXYGEN SATURATION: 95 % | BODY MASS INDEX: 23.49 KG/M2 | HEART RATE: 65 BPM | WEIGHT: 183 LBS | DIASTOLIC BLOOD PRESSURE: 70 MMHG | SYSTOLIC BLOOD PRESSURE: 102 MMHG

## 2025-08-19 DIAGNOSIS — E78.2 MIXED HYPERLIPIDEMIA: ICD-10-CM

## 2025-08-19 DIAGNOSIS — Z87.891 PERSONAL HISTORY OF TOBACCO USE: ICD-10-CM

## 2025-08-19 DIAGNOSIS — I25.10 CORONARY ARTERY DISEASE INVOLVING NATIVE CORONARY ARTERY OF NATIVE HEART WITHOUT ANGINA PECTORIS: Primary | ICD-10-CM

## 2025-08-19 DIAGNOSIS — Z79.899 MEDICATION MANAGEMENT: ICD-10-CM

## 2025-08-19 PROCEDURE — 99214 OFFICE O/P EST MOD 30 MIN: CPT | Performed by: INTERNAL MEDICINE

## 2025-08-19 PROCEDURE — 93000 ELECTROCARDIOGRAM COMPLETE: CPT | Performed by: INTERNAL MEDICINE

## 2025-08-19 PROCEDURE — G0296 VISIT TO DETERM LDCT ELIG: HCPCS | Performed by: INTERNAL MEDICINE
